# Patient Record
Sex: MALE | Race: BLACK OR AFRICAN AMERICAN | NOT HISPANIC OR LATINO
[De-identification: names, ages, dates, MRNs, and addresses within clinical notes are randomized per-mention and may not be internally consistent; named-entity substitution may affect disease eponyms.]

---

## 2023-02-26 ENCOUNTER — NON-APPOINTMENT (OUTPATIENT)
Age: 33
End: 2023-02-26

## 2024-06-01 ENCOUNTER — EMERGENCY (EMERGENCY)
Facility: HOSPITAL | Age: 34
LOS: 0 days | Discharge: HOME | End: 2024-06-02
Attending: EMERGENCY MEDICINE
Payer: COMMERCIAL

## 2024-06-01 VITALS
HEART RATE: 69 BPM | SYSTOLIC BLOOD PRESSURE: 142 MMHG | TEMPERATURE: 97 F | DIASTOLIC BLOOD PRESSURE: 86 MMHG | RESPIRATION RATE: 16 BRPM | HEIGHT: 67 IN | WEIGHT: 149.91 LBS

## 2024-06-01 DIAGNOSIS — S61.012A LACERATION WITHOUT FOREIGN BODY OF LEFT THUMB WITHOUT DAMAGE TO NAIL, INITIAL ENCOUNTER: ICD-10-CM

## 2024-06-01 DIAGNOSIS — S60.410A ABRASION OF RIGHT INDEX FINGER, INITIAL ENCOUNTER: ICD-10-CM
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
## 2024-06-01 DIAGNOSIS — S66.321A LACERATION OF EXTENSOR MUSCLE, FASCIA AND TENDON OF LEFT INDEX FINGER AT WRIST AND HAND LEVEL, INITIAL ENCOUNTER: ICD-10-CM

## 2024-06-01 DIAGNOSIS — S61.212A LACERATION WITHOUT FOREIGN BODY OF RIGHT MIDDLE FINGER WITHOUT DAMAGE TO NAIL, INITIAL ENCOUNTER: ICD-10-CM

## 2024-06-01 DIAGNOSIS — S61.210A LACERATION WITHOUT FOREIGN BODY OF RIGHT INDEX FINGER WITHOUT DAMAGE TO NAIL, INITIAL ENCOUNTER: ICD-10-CM
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
## 2024-06-01 DIAGNOSIS — S61.412A LACERATION WITHOUT FOREIGN BODY OF LEFT HAND, INITIAL ENCOUNTER: ICD-10-CM

## 2024-06-01 PROCEDURE — 12002 RPR S/N/AX/GEN/TRNK2.6-7.5CM: CPT

## 2024-06-01 PROCEDURE — 99285 EMERGENCY DEPT VISIT HI MDM: CPT | Mod: 25

## 2024-06-01 PROCEDURE — 73130 X-RAY EXAM OF HAND: CPT | Mod: 26,LT

## 2024-06-01 RX ORDER — ACETAMINOPHEN 500 MG
975 TABLET ORAL ONCE
Refills: 0 | Status: COMPLETED | OUTPATIENT
Start: 2024-06-01 | End: 2024-06-01

## 2024-06-01 RX ADMIN — Medication 975 MILLIGRAM(S): at 21:32

## 2024-06-01 NOTE — ED ADULT TRIAGE NOTE - CHIEF COMPLAINT QUOTE
Pt states he tripped over his son's toy and a mirror landed on him. Complains of lacerations to bilateral hand. Last Tetanus shot unknown.

## 2024-06-01 NOTE — ED ADULT NURSE NOTE - NSFALLUNIVINTERV_ED_ALL_ED
Bed/Stretcher in lowest position, wheels locked, appropriate side rails in place/Call bell, personal items and telephone in reach/Instruct patient to call for assistance before getting out of bed/chair/stretcher/Non-slip footwear applied when patient is off stretcher/[place] to call system/Physically safe environment - no spills, clutter or unnecessary equipment/Purposeful proactive rounding/Room/bathroom lighting operational, light cord in reach

## 2024-06-02 VITALS
OXYGEN SATURATION: 98 % | RESPIRATION RATE: 18 BRPM | SYSTOLIC BLOOD PRESSURE: 130 MMHG | TEMPERATURE: 98 F | HEART RATE: 75 BPM | DIASTOLIC BLOOD PRESSURE: 75 MMHG

## 2024-06-02 RX ORDER — TETANUS TOXOID, REDUCED DIPHTHERIA TOXOID AND ACELLULAR PERTUSSIS VACCINE, ADSORBED 5; 2.5; 8; 8; 2.5 [IU]/.5ML; [IU]/.5ML; UG/.5ML; UG/.5ML; UG/.5ML
0.5 SUSPENSION INTRAMUSCULAR ONCE
Refills: 0 | Status: COMPLETED | OUTPATIENT
Start: 2024-06-02 | End: 2024-06-02

## 2024-06-02 RX ADMIN — Medication 1 TABLET(S): at 00:49

## 2024-06-02 RX ADMIN — TETANUS TOXOID, REDUCED DIPHTHERIA TOXOID AND ACELLULAR PERTUSSIS VACCINE, ADSORBED 0.5 MILLILITER(S): 5; 2.5; 8; 8; 2.5 SUSPENSION INTRAMUSCULAR at 00:49

## 2024-06-02 NOTE — ED PROVIDER NOTE - PROGRESS NOTE DETAILS
Dr. Anderson was contacted for consult via phone with pics and video.  He recommends repair with sutures, start patient on augmentin and follow-up in the office.

## 2024-06-02 NOTE — ED PROVIDER NOTE - OBJECTIVE STATEMENT
This patient is a 34 year old man right hand dominant who presents to the ER to his bilateral hands with severe bleeding from left hand wound.  Patient reports that he tripped over his son's toy and fell into a mirror that broke.  The pain is constant 10/10 in severity.      Tetanus: not up to date

## 2024-06-02 NOTE — ED PROVIDER NOTE - PATIENT PORTAL LINK FT
You can access the FollowMyHealth Patient Portal offered by [facility] by registering at the following website: [email or web address]. By joining [facility]’s FollowMyHealth portal, you will also be able to view your health information using other applications (apps) compatible with our system.

## 2024-06-02 NOTE — ED PROVIDER NOTE - CARE PLAN
Principal Discharge DX:	Laceration of left hand  Secondary Diagnosis:	Multiple lacerations  Secondary Diagnosis:	Laceration of extensor tendon of hand   1

## 2024-06-02 NOTE — ED PROVIDER NOTE - PHYSICAL EXAMINATION
Left 2nd MCP 4 cm laceration - extensor tension injury  Left 1st MCP 1cm laceration  Abrasion 2nd right MCP  Right 2nd superficial laceration 2nd digit  Right laceration 3th at DIP 0.75 cm

## 2024-06-02 NOTE — ED PROVIDER NOTE - NSFOLLOWUPINSTRUCTIONS_ED_ALL_ED_FT
1) Take tylenol and or motrin for pain  2) Take prescription medication as instructed  3) Follow-up with hand.  Call on Monday for an appointment  4) Follow up with your primary care doctor  5) Sutures to be removed within 10 to 14 days  6) Return to the ER for worsening or concerning symptoms

## 2024-06-02 NOTE — ED PROVIDER NOTE - CLINICAL SUMMARY MEDICAL DECISION MAKING FREE TEXT BOX
Patient with abrasion and multiple lacerations to bilateral hand largest deepest laceration being left 2nd MCP.  Extensive pulsatile bleeding noted. Bleeding controlled with suture placement and wound explored.  Tendon injury noted.  Hand surgeon called and recommendations given.  Xray negative for foreign body  Laceration repaired.  Wound care, supportive care and follow-up discussed.

## 2024-06-04 ENCOUNTER — APPOINTMENT (OUTPATIENT)
Dept: ORTHOPEDIC SURGERY | Facility: CLINIC | Age: 34
End: 2024-06-04

## 2024-06-04 ENCOUNTER — APPOINTMENT (OUTPATIENT)
Dept: MRI IMAGING | Facility: CLINIC | Age: 34
End: 2024-06-04
Payer: COMMERCIAL

## 2024-06-04 PROCEDURE — 73218 MRI UPPER EXTREMITY W/O DYE: CPT | Mod: LT

## 2024-06-04 NOTE — HISTORY OF PRESENT ILLNESS
[de-identified] : 6/4/24: tripped into glass 6/1/24 and lacerated both hands- sutured at the hospital.  RHD, fedex delivery.

## 2024-06-04 NOTE — IMAGING
[de-identified] : left hand: sutured 3cm laceration clean dry and intact over radial side of distal aspect of MC of IF sutured 1cm laceration clean dry and intact over thumb MC no erythema no drainage rom limited finger appears to have some extension nvid

## 2024-06-04 NOTE — ASSESSMENT
[FreeTextEntry1] : The patient was advised of the diagnosis. The natural history of the pathology was explained in full to the patient in layman's terms. All questions were answered. The risks and benefits of surgical and non-surgical treatment alternatives were explained in full to the patient.  Wound care discussed MRI left hand r/o extensor tendon laceration F/u after MRI

## 2024-06-06 ENCOUNTER — APPOINTMENT (OUTPATIENT)
Dept: ORTHOPEDIC SURGERY | Facility: CLINIC | Age: 34
End: 2024-06-06
Payer: COMMERCIAL

## 2024-06-06 VITALS — WEIGHT: 155 LBS | BODY MASS INDEX: 24.33 KG/M2 | HEIGHT: 67 IN

## 2024-06-06 PROCEDURE — 99213 OFFICE O/P EST LOW 20 MIN: CPT

## 2024-06-06 NOTE — DATA REVIEWED
[MRI] : MRI [Left] : left [Hand] : hand [I independently reviewed and interpreted images and report] : I independently reviewed and interpreted images and report [FreeTextEntry1] : 1. moderate diffuse dorsal soft tissue swelling and extensor tendinitis, mild volar soft tissue swelling and flexor tendinitis, and focal severe muscle strain radial to the 2nd MC 2. moderate sprain at the dorsal radial 2nd MCP with moderate partial tearing of the RCL and dorsal radial capsule without ligament or tendon discontinuity. 3. No acute fx or malalignment 4. Small subcortical cysts in the radial head of the 2nd MC 5. Patient motion degrades image quality on multiple sequences 6. clinical correlation and f/u is needed. Soft tissue infection should be excluded clinically.

## 2024-06-06 NOTE — ASSESSMENT
[FreeTextEntry1] : The patient was advised of the diagnosis. The natural history of the pathology was explained in full to the patient in layman's terms. All questions were answered. The risks and benefits of surgical and non-surgical treatment alternatives were explained in full to the patient.  Wound care discussed. F/u in 5 days for suture removal

## 2024-06-06 NOTE — HISTORY OF PRESENT ILLNESS
[de-identified] : 6/6/24:  Pt is here to follow up MRI  MRI left hand (OCOA 6/4/24): 1. moderate diffuse dorsal soft tissue swelling and extensor tendinitis, mild volar soft tissue swelling and flexor tendinitis, and focal severe muscle strain radial to the 2nd MC 2. moderate sprain at the dorsal radial 2nd MCP with moderate partial tearing of the RCL and dorsal radial capsule without ligament or tendon discontinuity. 3. No acute fx or malalignment 4. Small subcortical cysts in the radial head of the 2nd MC 5. Patient motion degrades image quality on multiple sequences 6. clinical correlation and f/u is needed. Soft tissue infection should be excluded clinically.  The above is reviewed with patient today. Pt denies fevers/chills or increased pain to the left hand or right middle finger.   6/4/24: tripped into glass 6/1/24 and lacerated both hands tripped over a toy and went through a glass mirror- sutured at the hospital.  RHD, fedex delivery.

## 2024-06-06 NOTE — IMAGING
[de-identified] : left hand: sutured 3cm laceration clean dry and intact over radial side of distal aspect of MC of IF sutured 1cm laceration clean dry and intact over thumb MC no erythema no drainage rom mildly limited due to swelling able to flex all digits passively (normal rom actively to the right middle finger) sutures in place to the left dorsal hand IF and thumb as well as right middle finger. finger appears to have some extension weakness noted with abduction of the left index finger. nvid

## 2024-06-10 ENCOUNTER — NON-APPOINTMENT (OUTPATIENT)
Age: 34
End: 2024-06-10

## 2024-06-11 ENCOUNTER — APPOINTMENT (OUTPATIENT)
Dept: ORTHOPEDIC SURGERY | Facility: CLINIC | Age: 34
End: 2024-06-11
Payer: COMMERCIAL

## 2024-06-11 DIAGNOSIS — S61.412A LACERATION W/OUT FOREIGN BODY OF LEFT HAND, INITIAL ENCOUNTER: ICD-10-CM

## 2024-06-11 DIAGNOSIS — S61.212A LACERATION W/OUT FOREIGN BODY OF RIGHT MIDDLE FINGER W/OUT DAMAGE TO NAIL, INITIAL ENCOUNTER: ICD-10-CM

## 2024-06-11 PROCEDURE — 99213 OFFICE O/P EST LOW 20 MIN: CPT

## 2024-06-11 PROCEDURE — 15853 REMOVAL SUTR/STAPL XREQ ANES: CPT

## 2024-06-11 NOTE — HISTORY OF PRESENT ILLNESS
[de-identified] : 6/11/24:  Pt is doing well.  Denies fever/chills  6/6/24:  Pt is here to follow up MRI  MRI left hand (OCOA 6/4/24): 1. moderate diffuse dorsal soft tissue swelling and extensor tendinitis, mild volar soft tissue swelling and flexor tendinitis, and focal severe muscle strain radial to the 2nd MC 2. moderate sprain at the dorsal radial 2nd MCP with moderate partial tearing of the RCL and dorsal radial capsule without ligament or tendon discontinuity. 3. No acute fx or malalignment 4. Small subcortical cysts in the radial head of the 2nd MC 5. Patient motion degrades image quality on multiple sequences 6. clinical correlation and f/u is needed. Soft tissue infection should be excluded clinically.  The above is reviewed with patient today. Pt denies fevers/chills or increased pain to the left hand or right middle finger.   6/4/24: tripped into glass 6/1/24 and lacerated both hands tripped over a toy and went through a glass mirror- sutured at the hospital.  RHD, fedex delivery.

## 2024-06-11 NOTE — IMAGING
[de-identified] : left hand: 3cm wound clean dry and intact over radial side of distal aspect of MC of IF 1cm wound clean dry and intact over thumb MC no erythema no drainage mild stiffness nvid sutures removed

## 2024-06-24 ENCOUNTER — APPOINTMENT (OUTPATIENT)
Dept: ORTHOPEDIC SURGERY | Facility: CLINIC | Age: 34
End: 2024-06-24